# Patient Record
Sex: FEMALE | Race: ASIAN | NOT HISPANIC OR LATINO | URBAN - METROPOLITAN AREA
[De-identification: names, ages, dates, MRNs, and addresses within clinical notes are randomized per-mention and may not be internally consistent; named-entity substitution may affect disease eponyms.]

---

## 2019-10-17 ENCOUNTER — EMERGENCY (EMERGENCY)
Facility: HOSPITAL | Age: 34
LOS: 1 days | Discharge: HOME | End: 2019-10-17
Attending: EMERGENCY MEDICINE | Admitting: EMERGENCY MEDICINE
Payer: COMMERCIAL

## 2019-10-17 VITALS
DIASTOLIC BLOOD PRESSURE: 64 MMHG | RESPIRATION RATE: 16 BRPM | SYSTOLIC BLOOD PRESSURE: 110 MMHG | OXYGEN SATURATION: 100 % | TEMPERATURE: 98 F | HEART RATE: 80 BPM | WEIGHT: 113.1 LBS | HEIGHT: 65 IN

## 2019-10-17 DIAGNOSIS — Z88.0 ALLERGY STATUS TO PENICILLIN: ICD-10-CM

## 2019-10-17 DIAGNOSIS — R13.10 DYSPHAGIA, UNSPECIFIED: ICD-10-CM

## 2019-10-17 DIAGNOSIS — J36 PERITONSILLAR ABSCESS: ICD-10-CM

## 2019-10-17 DIAGNOSIS — Z88.1 ALLERGY STATUS TO OTHER ANTIBIOTIC AGENTS STATUS: ICD-10-CM

## 2019-10-17 DIAGNOSIS — Z79.899 OTHER LONG TERM (CURRENT) DRUG THERAPY: ICD-10-CM

## 2019-10-17 LAB
ANION GAP SERPL CALC-SCNC: 10 MMOL/L — SIGNIFICANT CHANGE UP (ref 5–17)
BASOPHILS # BLD AUTO: 0.04 K/UL — SIGNIFICANT CHANGE UP (ref 0–0.2)
BASOPHILS NFR BLD AUTO: 0.4 % — SIGNIFICANT CHANGE UP (ref 0–2)
BUN SERPL-MCNC: 8 MG/DL — SIGNIFICANT CHANGE UP (ref 7–23)
CALCIUM SERPL-MCNC: 9.2 MG/DL — SIGNIFICANT CHANGE UP (ref 8.4–10.5)
CHLORIDE SERPL-SCNC: 103 MMOL/L — SIGNIFICANT CHANGE UP (ref 96–108)
CO2 SERPL-SCNC: 26 MMOL/L — SIGNIFICANT CHANGE UP (ref 22–31)
CREAT SERPL-MCNC: 0.78 MG/DL — SIGNIFICANT CHANGE UP (ref 0.5–1.3)
EOSINOPHIL # BLD AUTO: 0.09 K/UL — SIGNIFICANT CHANGE UP (ref 0–0.5)
EOSINOPHIL NFR BLD AUTO: 0.8 % — SIGNIFICANT CHANGE UP (ref 0–6)
GLUCOSE SERPL-MCNC: 118 MG/DL — HIGH (ref 70–99)
HCT VFR BLD CALC: 38.2 % — SIGNIFICANT CHANGE UP (ref 34.5–45)
HGB BLD-MCNC: 12.7 G/DL — SIGNIFICANT CHANGE UP (ref 11.5–15.5)
IMM GRANULOCYTES NFR BLD AUTO: 0.4 % — SIGNIFICANT CHANGE UP (ref 0–1.5)
LYMPHOCYTES # BLD AUTO: 1.59 K/UL — SIGNIFICANT CHANGE UP (ref 1–3.3)
LYMPHOCYTES # BLD AUTO: 13.9 % — SIGNIFICANT CHANGE UP (ref 13–44)
MCHC RBC-ENTMCNC: 30.8 PG — SIGNIFICANT CHANGE UP (ref 27–34)
MCHC RBC-ENTMCNC: 33.2 GM/DL — SIGNIFICANT CHANGE UP (ref 32–36)
MCV RBC AUTO: 92.7 FL — SIGNIFICANT CHANGE UP (ref 80–100)
MONOCYTES # BLD AUTO: 0.81 K/UL — SIGNIFICANT CHANGE UP (ref 0–0.9)
MONOCYTES NFR BLD AUTO: 7.1 % — SIGNIFICANT CHANGE UP (ref 2–14)
NEUTROPHILS # BLD AUTO: 8.84 K/UL — HIGH (ref 1.8–7.4)
NEUTROPHILS NFR BLD AUTO: 77.4 % — HIGH (ref 43–77)
NRBC # BLD: 0 /100 WBCS — SIGNIFICANT CHANGE UP (ref 0–0)
PLATELET # BLD AUTO: 177 K/UL — SIGNIFICANT CHANGE UP (ref 150–400)
POTASSIUM SERPL-MCNC: 3.9 MMOL/L — SIGNIFICANT CHANGE UP (ref 3.5–5.3)
POTASSIUM SERPL-SCNC: 3.9 MMOL/L — SIGNIFICANT CHANGE UP (ref 3.5–5.3)
RBC # BLD: 4.12 M/UL — SIGNIFICANT CHANGE UP (ref 3.8–5.2)
RBC # FLD: 13 % — SIGNIFICANT CHANGE UP (ref 10.3–14.5)
SODIUM SERPL-SCNC: 139 MMOL/L — SIGNIFICANT CHANGE UP (ref 135–145)
WBC # BLD: 11.41 K/UL — HIGH (ref 3.8–10.5)
WBC # FLD AUTO: 11.41 K/UL — HIGH (ref 3.8–10.5)

## 2019-10-17 PROCEDURE — 99284 EMERGENCY DEPT VISIT MOD MDM: CPT | Mod: 25

## 2019-10-17 PROCEDURE — 80048 BASIC METABOLIC PNL TOTAL CA: CPT

## 2019-10-17 PROCEDURE — 87184 SC STD DISK METHOD PER PLATE: CPT

## 2019-10-17 PROCEDURE — 99284 EMERGENCY DEPT VISIT MOD MDM: CPT

## 2019-10-17 PROCEDURE — 87070 CULTURE OTHR SPECIMN AEROBIC: CPT

## 2019-10-17 PROCEDURE — 36415 COLL VENOUS BLD VENIPUNCTURE: CPT

## 2019-10-17 PROCEDURE — 85025 COMPLETE CBC W/AUTO DIFF WBC: CPT

## 2019-10-17 PROCEDURE — 96368 THER/DIAG CONCURRENT INF: CPT | Mod: XU

## 2019-10-17 PROCEDURE — 96375 TX/PRO/DX INJ NEW DRUG ADDON: CPT | Mod: XU

## 2019-10-17 PROCEDURE — 42700 I&D ABSCESS PERITONSILLAR: CPT | Mod: RT

## 2019-10-17 PROCEDURE — 96365 THER/PROPH/DIAG IV INF INIT: CPT | Mod: XU

## 2019-10-17 RX ORDER — SODIUM CHLORIDE 9 MG/ML
1000 INJECTION INTRAMUSCULAR; INTRAVENOUS; SUBCUTANEOUS ONCE
Refills: 0 | Status: COMPLETED | OUTPATIENT
Start: 2019-10-17 | End: 2019-10-17

## 2019-10-17 RX ORDER — CIPROFLOXACIN LACTATE 400MG/40ML
1 VIAL (ML) INTRAVENOUS
Qty: 10 | Refills: 0
Start: 2019-10-17 | End: 2019-10-26

## 2019-10-17 RX ORDER — ETANERCEPT 25 MG
0 VIAL (EA) SUBCUTANEOUS
Qty: 0 | Refills: 0 | DISCHARGE

## 2019-10-17 RX ORDER — DEXAMETHASONE 0.5 MG/5ML
10 ELIXIR ORAL ONCE
Refills: 0 | Status: COMPLETED | OUTPATIENT
Start: 2019-10-17 | End: 2019-10-17

## 2019-10-17 RX ORDER — ONDANSETRON 8 MG/1
1 TABLET, FILM COATED ORAL
Qty: 20 | Refills: 0
Start: 2019-10-17

## 2019-10-17 RX ORDER — AZTREONAM 2 G
1000 VIAL (EA) INJECTION ONCE
Refills: 0 | Status: COMPLETED | OUTPATIENT
Start: 2019-10-17 | End: 2019-10-17

## 2019-10-17 RX ORDER — LEVOTHYROXINE SODIUM 125 MCG
0 TABLET ORAL
Qty: 0 | Refills: 0 | DISCHARGE

## 2019-10-17 RX ORDER — IBUPROFEN 200 MG
1 TABLET ORAL
Qty: 30 | Refills: 0
Start: 2019-10-17

## 2019-10-17 RX ORDER — KETOROLAC TROMETHAMINE 30 MG/ML
30 SYRINGE (ML) INJECTION ONCE
Refills: 0 | Status: DISCONTINUED | OUTPATIENT
Start: 2019-10-17 | End: 2019-10-17

## 2019-10-17 RX ORDER — METRONIDAZOLE 500 MG
500 TABLET ORAL ONCE
Refills: 0 | Status: COMPLETED | OUTPATIENT
Start: 2019-10-17 | End: 2019-10-17

## 2019-10-17 RX ADMIN — SODIUM CHLORIDE 1000 MILLILITER(S): 9 INJECTION INTRAMUSCULAR; INTRAVENOUS; SUBCUTANEOUS at 22:07

## 2019-10-17 RX ADMIN — Medication 1000 MILLIGRAM(S): at 23:06

## 2019-10-17 RX ADMIN — Medication 50 MILLIGRAM(S): at 22:06

## 2019-10-17 RX ADMIN — Medication 102 MILLIGRAM(S): at 22:07

## 2019-10-17 RX ADMIN — Medication 30 MILLIGRAM(S): at 23:05

## 2019-10-17 RX ADMIN — Medication 30 MILLIGRAM(S): at 22:07

## 2019-10-17 RX ADMIN — Medication 10 MILLIGRAM(S): at 23:06

## 2019-10-17 RX ADMIN — Medication 100 MILLIGRAM(S): at 23:06

## 2019-10-17 NOTE — CONSULT NOTE ADULT - CONSULT REQUESTED BY NAME
Dear Doctor ,    Thank you for asking me to see your patient, Ms. Ana Alcaraz, in consultation to evaluate her left cheek cyst.  Today I had the pleasure of seeing her at the Fac[facility] Surgery Clinic in the Department of Otolaryngology at [facility].    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 Enjoys running.   Diagnoses:  1) Left lateral cheek skin mass    Comorbidities: None  Pertinent medications: None  Photographs: [facility] consents signed July 25, 2018.  .  Care Checklist:   _Schedule for excision and complex closure in the OR with sedation given the size and location of the mass.        MEDICAL DECISION MAKING:   I recommend excision of this lesion for both diagnostic and treatment purposes because of clinical uncertainty as to the exact diagnosis and the recent growth to its significant size.  An extensive preprocedure discussion was held.  Ms. Alcaraz agrees with this plan. We have initiated procedure scheduling.  I look forward to seeing her on her procedure day.       It has been a pleasure to participate in the care of Ms. Alcaraz.  Thank you for this kind referral.      Sincerely,    Ge Khoury MD    Division of Facial Plastic and Reconstructive Surgery,   Department of Otolaryngology  [facility]   ______________________________________________________________________                HISTORY OF PRESENT ILLNESS and SKIN LESION QUESTIONAAIRE:  As you know, Ms. Alcaraz is an 23 year old-year-old female who presents with a skin lesion located on the left cheek.   She first noticed this lesion 6 years ago.    Previous biopsy of this lesion: none.     Bleeding: none.   Provider- Bleeding: none.     Pain: none.  Provider- Pain: none.    Color change: none.   Provider- Color change: more bluish purplish.     Growth: yes, slight.   Provider- Growth: steady growth over  ED 6 years with more growth over the last 6 months.     Ulceration: none.   Itching: none.  Purulence: none.   Oozing: none.   Edema: none.   Erythema: none.   History of rupture: none.   Recurrent physical trauma: none.         SKIN QUESTIONNAIRE:   Have you had a lot of sun exposure in the past? No    Do you remember blistering sunburns anywhere on your body? No  Do you currently smoke?No  Provider- Do you currently smoke?No    Have you smoked in the past?No  Have you had previous skin cancers? No  Provider- Have you had previous skin cancers? No    Family history of skin cancer?No      REVIEW OF SYSTEMS: a 12 system review was performed by the patient care staff:    Do you currently have or have you ever had in the past:    No. Complications with sedation or anesthesia.  No. Use blood thinners. No   No. Any heart problems.   No. Chest pain.   No. A pacemaker.  No. Problems with excessive bleeding or a bleeding disorder.  No. Problems with blood clots or a clotting disorder.   No. Sleep apnea or sleep with a CPAP machine.       No. Excessive scarring.   No. Night sweats.   No. Fevers.   No. Double vision.   No. Vision loss.   No. Snoring.   No. Difficulty breathing through your nose.   No. Runny nose.   No. Sneezing.   No. Itchy eyes.   No. Itchy throat.   No. Face pain.   No. Face weakness.   No. Face numbness.   No. Difficulty swallowing.   No. Pain with swallowing.,   No. Difficulty hearing or hearing loss.   No. Difficulty urinating.   No. Anxiety.   No. Depression.     FAMILY HISTORY:  No. Family history of excessive bleeding or a bleeding disorder.   No. Family history of blood clots or a clotting disorder.     No. Family history of skin cancer.    History reviewed. No pertinent family history.    PAST MEDICAL HISTORY: History reviewed. No pertinent past medical history.    PAST SURGICAL HISTORY:   Past Surgical History:   Procedure Laterality Date     EXCISE GANGLION WRIST         SOCIAL HISTORY:   Social  History   Substance Use Topics     Smoking status: Never Smoker     Smokeless tobacco: Never Used     Alcohol use Not on file       ALLERGIES: Review of patient's allergies indicates no known allergies.    MEDICATIONS:   Current Outpatient Prescriptions   Medication Sig Dispense Refill     NUVARING 0.12-0.015 MG/24HR vaginal ring INSERT 1 RING VAGINALLY AND KEEP IN PLACE FOR 3 WEEKS. REMOVE FOR 1 WEEK. REPEAT WITH NEW RING.  1         Patient Care Staff Signature: Estephania Gil RN  ______________________________________________    Provider- Review of systems, FH, PMH, PSH, SH, ALL, and Medications taken by the patient care staff was reviewed by me: Ge Khoury      Provider- PHYSICAL EXAMINATION:  CONSTITUTIONAL:  No apparent distress.  Pleasant affect.  Normal ability to communicate.  CRANIOFACIAL:  Normocephalic, atraumatic.    SKIN:   location:  left  lateral cheek near jawline.  size: [id number].  height: mass under the skin with overlying skin thinning and attachment of the mass. Freely mobile from underlying structures but densely adherent to the overlying skin.  color. Blue-purplish discoloration of the skin  ulceration: absent.  bleeding: absent.      EYES:  Extraocular muscles intact.  EARS:  Normal auricles.   NOSE: No external nasal valve collapse.  Slight septal deviation.  No polyps or purulence.  ORAL CAVITY AND OROPHARYNX: no lesions on inspection.  NECK:  The parotid is soft, without masses.  Supple laryngeal landmarks.  LYMPHATIC:  No palpable lymphadenopathy.  CARDIOVASCULAR:  Carotid pulses are palpable bilaterally.  NEUROLOGIC:  Facial nerve intact.  RESPIRATORY:  Normal respiratory effort.  No stridor.  Voice strong.        Provider- PREPROCEDURE COUNSELING FOR LESION EXCISION AND CLOSURE:  An extensive preoperative discussion was held.  The patient stated she understood the risks, benefits, alternatives and limitations of the procedure.  The risks were discussed, including but not limited to:  bleeding, infection, damage to surrounding structures, weakness, numbness, chronic pain, unfavorable change in her appearance, partial or total skin loss, widening of her scar, excessive scarring, extruded sutures, positive margins requiring further resection, discovery of a malignancy requiring treatment and unforeseen complications related to the procedure or anesthesia.  I described the limitation of this procedure in that a permenant scar at the surgery site(s) will always be present. The scar will be at least 3 times longer than the length of the lesion. I described how the scar will be red for many months and will hopefully fade over time. The patient stated she understood these risks and limitations and elects to proceed with surgery.  She also stated she had her questions answered to her satisfaction.

## 2019-10-17 NOTE — ED ADULT NURSE NOTE - CHPI ED NUR SYMPTOMS NEG
no weakness/no fever/no nausea/no chills/no loss of consciousness/no vomiting/no bleeding gums/no numbness/no syncope

## 2019-10-17 NOTE — ED ADULT NURSE NOTE - DISCHARGE DATE/TIME
History  Chief Complaint   Patient presents with    Sore Throat     Pt reports sore throat, cough and nasal congestion x 2 days        History provided by:  Patient  Sore Throat   Location:  Posterior  Quality:  Burning  Severity:  Moderate  Onset quality:  Gradual  Duration:  3 days  Timing:  Constant  Progression:  Worsening  Chronicity:  New  Relieved by:  Nothing  Worsened by:  Swallowing  Ineffective treatments:  OTC medications  Associated symptoms: cough, shortness of breath and sinus congestion    Associated symptoms: no abdominal pain, no chest pain, no chills, no fever, no headaches and no rash    Cough:     Cough characteristics:  Harsh    Sputum characteristics:  Nondescript    Severity:  Moderate    Onset quality:  Gradual    Duration:  3 days    Timing:  Sporadic    Progression:  Worsening  Shortness of breath:     Severity:  Mild    Onset quality:  Gradual    Duration:  3 days  Risk factors: no sick contacts        None       Past Medical History:   Diagnosis Date    Fallopian tube disorder     Known health problems: none     Ovarian cyst        History reviewed  No pertinent surgical history  History reviewed  No pertinent family history  I have reviewed and agree with the history as documented  Social History   Substance Use Topics    Smoking status: Never Smoker    Smokeless tobacco: Never Used    Alcohol use No        Review of Systems   Constitutional: Negative for appetite change, chills and fever  HENT: Positive for sore throat  Respiratory: Positive for cough and shortness of breath  Negative for wheezing  Cardiovascular: Negative for chest pain and palpitations  Gastrointestinal: Negative for abdominal pain, diarrhea, nausea and vomiting  Genitourinary: Negative for dysuria and hematuria  Musculoskeletal: Negative for neck pain  Skin: Negative for rash  Neurological: Negative for dizziness, weakness and headaches     Psychiatric/Behavioral: Negative for suicidal ideas  All other systems reviewed and are negative  Physical Exam  ED Triage Vitals [11/22/17 0728]   Temperature Pulse Respirations Blood Pressure SpO2   97 6 °F (36 4 °C) (!) 113 16 117/77 97 %      Temp Source Heart Rate Source Patient Position - Orthostatic VS BP Location FiO2 (%)   Oral Monitor Sitting Right arm --      Pain Score       7           Orthostatic Vital Signs  Vitals:    11/22/17 0728   BP: 117/77   Pulse: (!) 113   Patient Position - Orthostatic VS: Sitting       Physical Exam   Constitutional: She is oriented to person, place, and time  Vital signs are normal  She appears well-developed and well-nourished  Non-toxic appearance  HENT:   Head: Normocephalic and atraumatic  Right Ear: Tympanic membrane and external ear normal    Left Ear: Tympanic membrane and external ear normal    Nose: Mucosal edema and rhinorrhea present  Mouth/Throat: No uvula swelling  Posterior oropharyngeal erythema present  No oropharyngeal exudate  No tonsillar exudate  Eyes: Conjunctivae and EOM are normal  Pupils are equal, round, and reactive to light  Neck: Normal range of motion and full passive range of motion without pain  Neck supple  No Brudzinski's sign and no Kernig's sign noted  Cardiovascular: Normal rate, regular rhythm, normal heart sounds, intact distal pulses and normal pulses  No murmur heard  Pulmonary/Chest: Effort normal  No accessory muscle usage  No tachypnea  No respiratory distress  She has no decreased breath sounds  She has wheezes (end expiratory)  Abdominal: Soft  Bowel sounds are normal  She exhibits no distension  There is no tenderness  There is no rigidity, no rebound and no guarding  Musculoskeletal: Normal range of motion  Right lower leg: She exhibits no swelling  Left lower leg: She exhibits no swelling  Lymphadenopathy:     She has no cervical adenopathy  Neurological: She is alert and oriented to person, place, and time   She has normal strength and normal reflexes  No cranial nerve deficit or sensory deficit  Coordination and gait normal  GCS eye subscore is 4  GCS verbal subscore is 5  GCS motor subscore is 6  Skin: Skin is warm and dry  No rash noted  She is not diaphoretic  No pallor  Psychiatric: She has a normal mood and affect  Her speech is normal and behavior is normal  Judgment and thought content normal  Cognition and memory are normal    Nursing note and vitals reviewed  ED Medications  Medications   albuterol inhalation solution 5 mg (5 mg Nebulization Given 11/22/17 0739)   dexamethasone 10 mg/mL oral liquid 10 mg 1 mL (10 mg Oral Given 11/22/17 0738)   ipratropium (ATROVENT) 0 02 % inhalation solution 0 5 mg (0 5 mg Nebulization Given 11/22/17 0739)       Diagnostic Studies  Results Reviewed     Procedure Component Value Units Date/Time    Rapid Beta strep screen [[id number]]  (Normal) Collected:  11/22/17 0738    Lab Status:  Final result Specimen:  Throat from Throat Updated:  11/22/17 0755     Rapid Strep A Screen Negative    Throat culture [[id number]] Collected:  11/22/17 0738    Lab Status: In process Specimen:  Throat from Throat Updated:  11/22/17 0755                 No orders to display              Procedures  Procedures       Phone Contacts  ED Phone Contact    ED Course  ED Course                                MDM  CritCare Time    Disposition  Final diagnoses:   Acute bronchitis     Time reflects when diagnosis was documented in both MDM as applicable and the Disposition within this note     Time User Action Codes Description Comment    11/22/2017  7:44 AM Marleen Mccloud Add [J20 9] Acute bronchitis       ED Disposition     ED Disposition Condition Comment    Discharge  Nellie Welsh discharge to home/self care      Condition at discharge: Good        Follow-up Information     Follow up With Specialties Details Why ETHAN Fox, FORRESTNP Nurse Practitioner Go to If symptoms worsen [id number] [address] [facility] [address]  [phone]          Patient's Medications   Discharge Prescriptions    ALBUTEROL (PROVENTIL HFA,VENTOLIN HFA) 90 MCG/ACT INHALER    Inhale 2 puffs every 4 (four) hours as needed for wheezing (or cough)       Start Date: 11/22/2017End Date: --       Order Dose: 2 puffs       Quantity: 1 Inhaler    Refills: 0    CETIRIZINE (ZYRTEC) 10 MG TABLET    Take 1 tablet by mouth daily       Start Date: 11/22/2017End Date: 11/22/2018       Order Dose: 10 mg       Quantity: 30 tablet    Refills: 0    FLUTICASONE (FLONASE) 50 MCG/ACT NASAL SPRAY    1 spray into each nostril daily       Start Date: 11/22/2017End Date: 11/22/2018       Order Dose: 1 spray       Quantity: 16 g    Refills: 0    PROMETHAZINE-CODEINE (PHENERGAN WITH CODEINE) 6 25-10 MG/5 ML SYRUP    Take 5 mL by mouth every 6 (six) hours as needed for cough for up to 10 days       Start Date: 11/22/2017End Date: 12/2/2017       Order Dose: 5 mL       Quantity: 120 mL    Refills: 0     No discharge procedures on file      ED Provider  Electronically Signed by           Buster Henderson MD  11/22/17 3460 17-Oct-2019 23:16

## 2019-10-17 NOTE — CONSULT NOTE ADULT - SUBJECTIVE AND OBJECTIVE BOX
33 yo female h/o RA, Sjogren's, hashimoto's, and prior PTA in 2015 presents withthroat pain R>L. . Had s/s of tonsillitis 2 weeks ago, was given PO penicillin at urgent care center. Took 7/10 days but then stopped due to developing rash. Also with reported allergy to clinda (itching/hives). Now with 2 days of worsening throat pain odynophagia, dysphagia, and trismus. Deneis neck stiffness, SOB, stridor. Had 1 PTA in 2015 which was drained. No other hx of tonsil infections. No hx of surgery in head/neck. No recent travel/sick contacts.     In the ED recieved IV pain medication, dexamethasone and IV aztreonam/flagyl     PMH - as above  Alleriges - clinda, penicillin     Exam  Gen - awake, alert in NAD. Conversing in full sentences. voice strong and clear  Oc/OP - R erythema and bulging of soft palate with medialization of tonsil, uvula deviated to left. Tonsils 4+   Neck - soft, flat, FROM, no LAD  Resp - breathing comfortably on RA    Vital Signs Last 24 Hrs  T(C): 36.8 (17 Oct 2019 20:56), Max: 36.8 (17 Oct 2019 20:56)  T(F): 98.2 (17 Oct 2019 20:56), Max: 98.2 (17 Oct 2019 20:56)  HR: 80 (17 Oct 2019 20:56) (80 - 80)  BP: 110/64 (17 Oct 2019 20:56) (110/64 - 110/64)  BP(mean): --  RR: 16 (17 Oct 2019 20:56) (16 - 16)  SpO2: 100% (17 Oct 2019 20:56) (100% - 100%)      Procedure Note - I&D of R peritonsillar abscess  r/b/a of I&D discussed at length. All questions answered. Verbal consent obtained. R OP anesthetized  using topical cetacaine spry. 2 cc of 1% lidocaine with epi infiltrated into junction of soft palate and anterior pillar. 18G needle used to aspirate peritonsillar space with aspiration of 6cc of pus. 1cm incision then made using #11 blade following curve of the tonsil. A curved jonelle clamp was then inserted to break up any loculations. Patient then rinsed with saline/peroxide 1:1. OP examined to ensure adequate hemostasis. Purulence sent for culture.

## 2019-10-17 NOTE — ED PROVIDER NOTE - CARE PROVIDER_API CALL
Maude Marmolejo)  Otolaryngology  [address] [facility], [address]  [place], [place] [address]  Phone: [phone]  Fax: [phone]  Follow Up Time:

## 2019-10-17 NOTE — ED PROVIDER NOTE - OBJECTIVE STATEMENT
35 yo female h/o RA, Sjogren's, hashimoto's, tonsillitis sent by ENT for eval for PTA.  Pt reports tonsillitis 2 wk ago treated w pcn x 7 out of 10 d but stopped 2/2 rash/hives.  Pt reports neg flu and strep test at that time.  Pt w recurrent st more painful on R and worse w swallowing x 3 d, started on cipro yest and had aspiration attempt w her ENT yest w/o relief.  Today, pt w worse pain and swelling w deviation of her tonsil so sent by her ENT for eval and drainage.  Pt reports pain 7/10, fever up to 102.  Pt took motrin 600 mg w relief of fever and improved but not completely alleviate pain.  + pain w eating and talking.  No sob.

## 2019-10-17 NOTE — ED PROVIDER NOTE - NSFOLLOWUPINSTRUCTIONS_ED_ALL_ED_FT
Peritonsillar Abscess    Take levaquin once a day for 10 days.  Take Zofran - 1 tab on tongue every 6 hours as needed for nausea.   Take ibuprofen 1 tab every 6 hours with food as needed for pain.   Return for increased pain, fever, vomiting, difficulty breathing or swallowing.     Follow up with your ENT.     Peritonsillar Abscess    A peritonsillar abscess is an infected area in your throat that is filled with pus. It forms behind your tonsils. This may be treated by:  Draining the pus. Your doctor may do this with a syringe and a needle (needle aspiration) or by making a cut in the abscess.Using antibiotic medicine.Follow these instructions at home:  Medicines     Take over-the-counter and prescription medicines only as told by your doctor.If you were prescribed an antibiotic, take it as told by your doctor. Do not stop taking the antibiotic even if you start to feel better.Eating and drinking       Drink enough fluid to keep your pee (urine) pale yellow.While your throat is sore, try one of these:  Only drinking liquids.Eating only soft foods, such as yogurt and ice cream.General instructions     Rest as much as you can. Get plenty of sleep.Return to your normal activities as told by your doctor. Ask your doctor what activities are safe for you.If your abscess was drained, gargle with a salt-water mixture 3–4 times a day or as needed.  To make a salt-water mixture, completely dissolve ½–1 tsp of salt in 1 cup of warm water. Do not swallow this mixture.Do not use any products that have nicotine or tobacco in them. These include cigarettes and e-cigarettes. If you need help quitting, ask your doctor.Keep all follow-up visits as told by your doctor. This is important.Contact a doctor if you have:  More pain, swelling, redness, or pus in your throat.A headache.Low energy (lethargy).A general feeling of illness (malaise).A fever.Dizziness.Trouble swallowing.Trouble eating.Signs of body fluid loss (dehydration), such as:  Feeling light-headed when you are standing.Peeing (urinating) less than usual.A fast heart rate.Dry mouth.Get help right away if you:  Have trouble talking.Have trouble breathing.Breathing is easier when you lean forward.Cough up blood.Throw up (vomit) blood.Have very bad throat pain and it does not get better with medicine.Summary  A peritonsillar abscess is an infected area in your throat that is filled with pus.You may be treated by having the abscess drained and by taking antibiotic medicine.Contact a doctor if you have trouble swallowing or eating.Get help right away if you cough up blood or see blood when you throw up (vomit).

## 2019-10-17 NOTE — ED PROVIDER NOTE - PATIENT PORTAL LINK FT
You can access the FollowMyHealth Patient Portal offered by [facility] by registering at the following website: [email or web address]. By joining [facility]’s FollowMyHealth portal, you will also be able to view your health information using other applications (apps) compatible with our system.

## 2019-10-17 NOTE — ED ADULT NURSE NOTE - OBJECTIVE STATEMENT
Patient presents to the ED with c/o sore throat sent by ENT for drainage of tonsillar abscess. Denies bleeding, drainage or inability to swallow or speak. MD medley in process. No SOB at this time. NAD Noted

## 2019-10-17 NOTE — CONSULT NOTE ADULT - ASSESSMENT
33 yo female h/o RA, Sjogren's, hashimoto's, and prior PTA in 2015 presents with right PTA. Now s/p I&D.  -levaquin x 10 days  -prednisone x 3 days  -soft diet only x 24 hours  -rinse with saline/peroxide mixture if small amount of blood noted in saliva  -return to the ED for increasing pain, active bleeding/coughing up clots, difficulty breahing, neck stiffness  -follow up with Dr Almeida   -page ENT with questions/concerns     D/w Dr almeida

## 2019-10-17 NOTE — ED PROVIDER NOTE - PROGRESS NOTE DETAILS
ENT in ed - planning drainage and will return w final recommendations after drainage.  Pt signed out to Dr Ray and ELIZABETH Campbell.  Likely dc w levaquin and fu with ENT. PTA drained by ENT, recommended levaquin and 3 days of low dose prednisone

## 2019-10-17 NOTE — ED PROVIDER NOTE - ENMT, MLM
Airway patent, Nasal mucosa clear. Mouth with normal mucosa. Throat has erythema, no exudate, + R peritonsillar fullness w deviation of uvula to L, slightly hoarse voice, no drool or stridor, no trismus

## 2019-10-17 NOTE — ED ADULT NURSE NOTE - CAS EDN DISCHARGE ASSESSMENT
Symptoms improved/drainage of abscess by ENT. Patient states decrease in pain level./Alert and oriented to person, place and time

## 2019-10-18 LAB
GRAM STN FLD: SIGNIFICANT CHANGE UP
SPECIMEN SOURCE: SIGNIFICANT CHANGE UP

## 2019-10-21 LAB
-  AMPICILLIN/SULBACTAM: SIGNIFICANT CHANGE UP
-  AMPICILLIN: SIGNIFICANT CHANGE UP
-  CEFTRIAXONE: SIGNIFICANT CHANGE UP
-  CHLORAMPHENICOL: SIGNIFICANT CHANGE UP
-  CIPROFLOXACIN: SIGNIFICANT CHANGE UP
-  CLARITHROMYCIN: SIGNIFICANT CHANGE UP
-  LEVOFLOXACIN: SIGNIFICANT CHANGE UP
-  TRIMETHOPRIM/SULFAMETHOXAZOLE: SIGNIFICANT CHANGE UP
CULTURE RESULTS: SIGNIFICANT CHANGE UP
METHOD TYPE: SIGNIFICANT CHANGE UP
ORGANISM # SPEC MICROSCOPIC CNT: SIGNIFICANT CHANGE UP
ORGANISM # SPEC MICROSCOPIC CNT: SIGNIFICANT CHANGE UP
SPECIMEN SOURCE: SIGNIFICANT CHANGE UP

## 2023-04-27 NOTE — ED PROVIDER NOTE - GASTROINTESTINAL, MLM
We discussed the differential diagnosis of fecal incontinence including stress, urge, and unconscious leakage. We discussed the implications of better coordination of the pelvic floor. We discussed the implications of better coordination of the pelvic floor. We discussed management of potential sphincter defects. We discussed the importance of stool consistency and the role of diet modification. The initial treatment of fecal incontinence is assuring that there is enough fiber in the diet. A high fiber diet with plenty of fluids (up to 8 glasses of water daily) is suggested to relieve these symptoms. Citrucel, Metamucil, Bene Fiber, Fibercon etc, are great supplements. The goal is to slowly work up to 25-30g of fiber daily. Abdomen soft, non-tender, no guarding.

## 2023-10-20 NOTE — ED PROVIDER NOTE - CPE EDP CARDIAC NORM
In an effort to ensure that our patients [employer], a Team Member has reviewed your chart and identified an opportunity to provide the best care possible. An attempt was made to discuss or schedule overdue Preventive or Disease Management screening.     The Outcome was Contact was not made, left messageIf you have any questions or need help with scheduling, contact your primary care provider.. Care Gaps include Immunizations.    
normal...
No

## 2023-11-10 NOTE — ED PROVIDER NOTE - CLINICAL SUMMARY MEDICAL DECISION MAKING FREE TEXT BOX
n/a Pt sent by ENT for pta w + pta on exam.  Plan labs, abx, toradol, decadron, ivf.  Dispo per ent.
